# Patient Record
Sex: MALE | Race: WHITE
[De-identification: names, ages, dates, MRNs, and addresses within clinical notes are randomized per-mention and may not be internally consistent; named-entity substitution may affect disease eponyms.]

---

## 2018-05-08 ENCOUNTER — HOSPITAL ENCOUNTER (OUTPATIENT)
Dept: HOSPITAL 17 - PHSDC | Age: 78
Discharge: HOME | End: 2018-05-08
Attending: OTOLARYNGOLOGY
Payer: MEDICARE

## 2018-05-08 VITALS
SYSTOLIC BLOOD PRESSURE: 120 MMHG | OXYGEN SATURATION: 97 % | RESPIRATION RATE: 16 BRPM | DIASTOLIC BLOOD PRESSURE: 65 MMHG | HEART RATE: 77 BPM

## 2018-05-08 VITALS — WEIGHT: 167.55 LBS | HEIGHT: 67 IN | BODY MASS INDEX: 26.3 KG/M2

## 2018-05-08 VITALS — HEART RATE: 75 BPM | TEMPERATURE: 98 F

## 2018-05-08 VITALS — HEART RATE: 77 BPM

## 2018-05-08 DIAGNOSIS — Z87.891: ICD-10-CM

## 2018-05-08 DIAGNOSIS — R05: ICD-10-CM

## 2018-05-08 DIAGNOSIS — J34.2: ICD-10-CM

## 2018-05-08 DIAGNOSIS — C09.8: Primary | ICD-10-CM

## 2018-05-08 DIAGNOSIS — R49.0: ICD-10-CM

## 2018-05-08 DIAGNOSIS — Z01.810: ICD-10-CM

## 2018-05-08 PROCEDURE — 00320 ANES ALL PX NECK NOS 1YR/>: CPT

## 2018-05-08 PROCEDURE — 31535 LARYNGOSCOPY W/BIOPSY: CPT

## 2018-05-08 PROCEDURE — 88304 TISSUE EXAM BY PATHOLOGIST: CPT

## 2018-05-08 PROCEDURE — 88305 TISSUE EXAM BY PATHOLOGIST: CPT

## 2018-05-08 PROCEDURE — 88341 IMHCHEM/IMCYTCHM EA ADD ANTB: CPT

## 2018-05-08 PROCEDURE — 88342 IMHCHEM/IMCYTCHM 1ST ANTB: CPT

## 2018-05-08 PROCEDURE — 93005 ELECTROCARDIOGRAM TRACING: CPT

## 2018-06-25 ENCOUNTER — HOSPITAL ENCOUNTER (OUTPATIENT)
Dept: HOSPITAL 17 - HROP | Age: 78
Discharge: HOME | End: 2018-06-25
Attending: INTERNAL MEDICINE
Payer: MEDICARE

## 2018-06-25 VITALS
DIASTOLIC BLOOD PRESSURE: 69 MMHG | RESPIRATION RATE: 18 BRPM | HEART RATE: 70 BPM | TEMPERATURE: 98.5 F | SYSTOLIC BLOOD PRESSURE: 117 MMHG | OXYGEN SATURATION: 96 %

## 2018-06-25 VITALS
RESPIRATION RATE: 18 BRPM | HEART RATE: 68 BPM | TEMPERATURE: 98.5 F | SYSTOLIC BLOOD PRESSURE: 117 MMHG | DIASTOLIC BLOOD PRESSURE: 67 MMHG | OXYGEN SATURATION: 98 %

## 2018-06-25 VITALS
RESPIRATION RATE: 18 BRPM | SYSTOLIC BLOOD PRESSURE: 117 MMHG | HEART RATE: 70 BPM | DIASTOLIC BLOOD PRESSURE: 69 MMHG | OXYGEN SATURATION: 96 % | TEMPERATURE: 98.5 F

## 2018-06-25 DIAGNOSIS — C09.9: ICD-10-CM

## 2018-06-25 DIAGNOSIS — Z45.2: Primary | ICD-10-CM

## 2018-06-25 DIAGNOSIS — C14.0: ICD-10-CM

## 2018-06-25 DIAGNOSIS — E03.9: ICD-10-CM

## 2018-06-25 PROCEDURE — 77001 FLUOROGUIDE FOR VEIN DEVICE: CPT

## 2018-06-25 PROCEDURE — 36569 INSJ PICC 5 YR+ W/O IMAGING: CPT

## 2018-06-25 PROCEDURE — 76937 US GUIDE VASCULAR ACCESS: CPT

## 2018-06-25 PROCEDURE — C1751 CATH, INF, PER/CENT/MIDLINE: HCPCS

## 2018-06-25 NOTE — PD.RAD
Radiology Post PICC Prog Note


Pre Procedure Diagnosis:  


(1) Throat cancer


Post Procedure Diagnosis:  


(1) Throat cancer


Procedure:  Right PICC line placement


Procedure Date:


Jun 25, 2018


Supervising Radiologist


Crow Bazan


Proceduralist/Assist:  Cheryl Brown RT(R)(VI)


Device


Side:  Right


[place]:  4


single lumen


cm:  39


Catheter:  Power PICC





Plan of Activity


Patient to Unit:  [facility]


Patient Condition:  Good


PICC line can be used immediately











Crow Bazan MD Jun 25, 2018 16:10

## 2018-06-26 NOTE — RADRPT
EXAM DATE:  6/26/2018 8:47 AM EDT

AGE/SEX:        77 years / Male



INDICATIONS:  Patient needs good IV access for chemotherapy. throat cancer.



CLINICAL DATA:  This is the patient's initial encounter. Patient reports that signs and symptoms have
 been present for 3 weeks and indicates a pain score of 1/10. 

                                                                          

MEDICAL/SURGICAL HISTORY:       Hypothyroidism. nasal allergies, throat cancer, lumbar and thoracic i
njuries lumbar thoracic surgery, throat bx.



COMPARISON:      No prior exams available for comparison. 





FLUORO TIME (min):     0.1

IMAGE SERIES:               1

ACCESS SITE:                  Right basilic vein



























DEVICE(S):

4 [place] single lumen 39 cm X[employer]a Power PICC

 

. .



PROCEDURE :    

1.  Ultrasound guidance for venous catheterization.

2.  Fluoroscopic guidance.

3.  Ultrasound & fluoroscopic guided central venous Power PICC line placement.



The risks, benefits and alternatives to the procedure were explained and verbal and written consent w
as obtained.  The site was prepped in sterile fashion.  Full sterile technique was used, including ca
p, mask, sterile gloves and gown and a large sterile sheet.  Hand hygiene and 2% chlorhexidine prep w
as utilized per protocol for cutaneous antisepsis with appropriate dry time for site.   Sterile gel a
nd sterile probe cover were utilized for ultrasound guidance.   The skin and subcutaneous tissues wer
e infiltrated with local anesthetic solution.  



Under direct ultrasound guidance, a suitable vein was accessed and a measuring guidewire was introduc
ed and positioned in the central venous system.  The ultrasound images depicting access guidance were
 saved and stored to PACS for permanent record.



A Power Injectable PICC line was cut to prescribed length and introduced, positioned with tip at the 
cavoatrial junction level.  The line was flushed and secured per protocol.





CONCLUSION:  

1.  Uncomplicated central venous Power PICC line placement.

2.  The PICC line can be used immediately.



Electronically signed by: Crow Bazan MD  6/26/2018 12:47 PM EDT